# Patient Record
Sex: FEMALE | Race: WHITE | NOT HISPANIC OR LATINO | URBAN - METROPOLITAN AREA
[De-identification: names, ages, dates, MRNs, and addresses within clinical notes are randomized per-mention and may not be internally consistent; named-entity substitution may affect disease eponyms.]

---

## 2020-01-09 ENCOUNTER — APPOINTMENT (RX ONLY)
Dept: URBAN - METROPOLITAN AREA CLINIC 10 | Facility: CLINIC | Age: 40
Setting detail: DERMATOLOGY
End: 2020-01-09

## 2020-01-09 DIAGNOSIS — Z02.9 ENCOUNTER FOR ADMINISTRATIVE EXAMINATIONS, UNSPECIFIED: ICD-10-CM

## 2020-01-09 PROCEDURE — ? NO SHOW PLAN

## 2020-01-14 ENCOUNTER — APPOINTMENT (RX ONLY)
Dept: URBAN - METROPOLITAN AREA CLINIC 10 | Facility: CLINIC | Age: 40
Setting detail: DERMATOLOGY
End: 2020-01-14

## 2020-01-14 DIAGNOSIS — L29.8 OTHER PRURITUS: ICD-10-CM

## 2020-01-14 DIAGNOSIS — Z00.00 ENCOUNTER FOR GENERAL ADULT MEDICAL EXAMINATION WITHOUT ABNORMAL FINDINGS: ICD-10-CM

## 2020-01-14 DIAGNOSIS — L29.89 OTHER PRURITUS: ICD-10-CM

## 2020-01-14 DIAGNOSIS — D22 MELANOCYTIC NEVI: ICD-10-CM

## 2020-01-14 DIAGNOSIS — Z71.89 OTHER SPECIFIED COUNSELING: ICD-10-CM

## 2020-01-14 DIAGNOSIS — L90.0 LICHEN SCLEROSUS ET ATROPHICUS: ICD-10-CM

## 2020-01-14 PROBLEM — D23.9 OTHER BENIGN NEOPLASM OF SKIN, UNSPECIFIED: Status: ACTIVE | Noted: 2020-01-14

## 2020-01-14 PROBLEM — Z71.1 PERSON WITH FEARED HEALTH COMPLAINT IN WHOM NO DIAGNOSIS IS MADE: Status: ACTIVE | Noted: 2020-01-14

## 2020-01-14 PROBLEM — D22.9 MELANOCYTIC NEVI, UNSPECIFIED: Status: ACTIVE | Noted: 2020-01-14

## 2020-01-14 PROCEDURE — 99214 OFFICE O/P EST MOD 30 MIN: CPT

## 2020-01-14 PROCEDURE — ? PRESCRIPTION

## 2020-01-14 PROCEDURE — ? COUNSELING

## 2020-01-14 RX ORDER — HALOBETASOL PROPIONATE 0.5 MG/G
SMALL AMOUNT OINTMENT TOPICAL
Qty: 1 | Refills: 3 | Status: ERX | COMMUNITY
Start: 2020-01-14

## 2020-01-14 RX ORDER — FLUOCINONIDE 0.5 MG/ML
SOLUTION TOPICAL
Qty: 1 | Refills: 3 | Status: ERX | COMMUNITY
Start: 2020-01-14

## 2020-01-14 RX ORDER — TACROLIMUS 1 MG/G
SMALL AMOUNT OINTMENT TOPICAL BID
Qty: 1 | Refills: 5 | Status: ERX | COMMUNITY
Start: 2020-01-14

## 2020-01-14 RX ADMIN — FLUOCINONIDE AS DIRECTED: 0.5 SOLUTION TOPICAL at 00:00

## 2020-01-14 RX ADMIN — HALOBETASOL PROPIONATE SMALL AMOUNT: 0.5 OINTMENT TOPICAL at 00:00

## 2020-01-14 RX ADMIN — TACROLIMUS SMALL AMOUNT: 1 OINTMENT TOPICAL at 00:00

## 2020-01-14 ASSESSMENT — LOCATION ZONE DERM
LOCATION ZONE: VULVA
LOCATION ZONE: SCALP
LOCATION ZONE: FEET

## 2020-01-14 ASSESSMENT — LOCATION SIMPLE DESCRIPTION DERM
LOCATION SIMPLE: LEFT PLANTAR SURFACE
LOCATION SIMPLE: RIGHT PLANTAR SURFACE
LOCATION SIMPLE: POSTERIOR SCALP
LOCATION SIMPLE: VULVA

## 2020-01-14 ASSESSMENT — LOCATION DETAILED DESCRIPTION DERM
LOCATION DETAILED: POSTERIOR MID-PARIETAL SCALP
LOCATION DETAILED: LEFT PLANTAR FOREFOOT OVERLYING 3RD METATARSAL
LOCATION DETAILED: RIGHT PLANTAR FOREFOOT OVERLYING 2ND METATARSAL
LOCATION DETAILED: LEFT LABIA MAJORA

## 2020-01-14 NOTE — HPI: RASH
What Type Of Note Output Would You Prefer (Optional)?: Standard Output
Is The Patient Presenting As Previously Scheduled?: Yes
How Severe Is Your Rash?: moderate
Is This A New Presentation, Or A Follow-Up?: Rash
Additional History: Taking ketoconazole cream, clobetasol ointment, and betamethasone-gentamicin-miconazole cream. Pt states nothing is working. States that tea tree ointment is working the best on the scalp and gential area. Using terbinafine on feet. She sees her gynecologist for lichen sclerosus of the vulva

## 2020-09-21 ENCOUNTER — APPOINTMENT (RX ONLY)
Dept: URBAN - METROPOLITAN AREA CLINIC 10 | Facility: CLINIC | Age: 40
Setting detail: DERMATOLOGY
End: 2020-09-21

## 2020-09-21 DIAGNOSIS — L91.8 OTHER HYPERTROPHIC DISORDERS OF THE SKIN: ICD-10-CM

## 2020-09-21 DIAGNOSIS — A63.0 ANOGENITAL (VENEREAL) WARTS: ICD-10-CM

## 2020-09-21 DIAGNOSIS — L70.8 OTHER ACNE: ICD-10-CM

## 2020-09-21 PROCEDURE — ? COUNSELING

## 2020-09-21 PROCEDURE — ? LIQUID NITROGEN GENITALS

## 2020-09-21 PROCEDURE — 56501 DESTROY VULVA LESIONS SIM: CPT

## 2020-09-21 PROCEDURE — 99213 OFFICE O/P EST LOW 20 MIN: CPT | Mod: 25

## 2020-09-21 PROCEDURE — ? PRESCRIPTION

## 2020-09-21 RX ORDER — IMIQUIMOD 50 MG/G
SMALL AMOUNT CREAM TOPICAL TIW
Qty: 1 | Refills: 1 | Status: ERX | COMMUNITY
Start: 2020-09-21

## 2020-09-21 RX ADMIN — IMIQUIMOD SMALL AMOUNT: 50 CREAM TOPICAL at 00:00

## 2020-09-21 ASSESSMENT — LOCATION DETAILED DESCRIPTION DERM
LOCATION DETAILED: LEFT INGUINAL FOLD
LOCATION DETAILED: MONS PUBIS
LOCATION DETAILED: LEFT LABIA MAJORA
LOCATION DETAILED: RIGHT SUPRAPUBIC SKIN

## 2020-09-21 ASSESSMENT — LOCATION ZONE DERM
LOCATION ZONE: VULVA
LOCATION ZONE: TRUNK
LOCATION ZONE: LEG

## 2020-09-21 ASSESSMENT — LOCATION SIMPLE DESCRIPTION DERM
LOCATION SIMPLE: GROIN
LOCATION SIMPLE: VULVA
LOCATION SIMPLE: LEFT INGUINAL FOLD

## 2021-11-04 ENCOUNTER — APPOINTMENT (RX ONLY)
Dept: URBAN - METROPOLITAN AREA CLINIC 10 | Facility: CLINIC | Age: 41
Setting detail: DERMATOLOGY
End: 2021-11-04

## 2021-11-04 DIAGNOSIS — L90.0 LICHEN SCLEROSUS ET ATROPHICUS: ICD-10-CM

## 2021-11-04 DIAGNOSIS — D22 MELANOCYTIC NEVI: ICD-10-CM

## 2021-11-04 DIAGNOSIS — D485 NEOPLASM OF UNCERTAIN BEHAVIOR OF SKIN: ICD-10-CM

## 2021-11-04 DIAGNOSIS — L72.0 EPIDERMAL CYST: ICD-10-CM

## 2021-11-04 DIAGNOSIS — L81.8 OTHER SPECIFIED DISORDERS OF PIGMENTATION: ICD-10-CM

## 2021-11-04 DIAGNOSIS — Z71.89 OTHER SPECIFIED COUNSELING: ICD-10-CM

## 2021-11-04 DIAGNOSIS — L21.8 OTHER SEBORRHEIC DERMATITIS: ICD-10-CM

## 2021-11-04 PROBLEM — D22.5 MELANOCYTIC NEVI OF TRUNK: Status: ACTIVE | Noted: 2021-11-04

## 2021-11-04 PROBLEM — D23.9 OTHER BENIGN NEOPLASM OF SKIN, UNSPECIFIED: Status: ACTIVE | Noted: 2021-11-04

## 2021-11-04 PROBLEM — D48.5 NEOPLASM OF UNCERTAIN BEHAVIOR OF SKIN: Status: ACTIVE | Noted: 2021-11-04

## 2021-11-04 PROCEDURE — 11102 TANGNTL BX SKIN SINGLE LES: CPT

## 2021-11-04 PROCEDURE — 99214 OFFICE O/P EST MOD 30 MIN: CPT | Mod: 25

## 2021-11-04 PROCEDURE — ? OBSERVATION

## 2021-11-04 PROCEDURE — ? COUNSELING

## 2021-11-04 PROCEDURE — ? BIOPSY BY SHAVE METHOD

## 2021-11-04 PROCEDURE — ? PRESCRIPTION

## 2021-11-04 RX ORDER — HALOBETASOL PROPIONATE 0.5 MG/G
SMALL AMOUNT OINTMENT TOPICAL BID
Qty: 15 | Refills: 5 | Status: ERX

## 2021-11-04 RX ORDER — MUPIROCIN 20 MG/G
SMALL AMOUNT OINTMENT TOPICAL BID
Qty: 1 | Refills: 0 | Status: ERX | COMMUNITY
Start: 2021-11-04

## 2021-11-04 RX ORDER — CALCIPOTRIENE 0.05 MG/G
SMALL AMOUNT CREAM TOPICAL BID
Qty: 60 | Refills: 5 | Status: ERX | COMMUNITY
Start: 2021-11-04

## 2021-11-04 RX ADMIN — MUPIROCIN SMALL AMOUNT: 20 OINTMENT TOPICAL at 00:00

## 2021-11-04 RX ADMIN — CALCIPOTRIENE SMALL AMOUNT: 0.05 CREAM TOPICAL at 00:00

## 2021-11-04 ASSESSMENT — LOCATION DETAILED DESCRIPTION DERM
LOCATION DETAILED: RIGHT MEDIAL UPPER BACK
LOCATION DETAILED: LEFT SUPERIOR PARIETAL SCALP
LOCATION DETAILED: LEFT PROXIMAL POSTERIOR THIGH
LOCATION DETAILED: LEFT ULNAR DORSAL HAND
LOCATION DETAILED: LEFT LABIA MAJORA
LOCATION DETAILED: LEFT BUTTOCK

## 2021-11-04 ASSESSMENT — LOCATION SIMPLE DESCRIPTION DERM
LOCATION SIMPLE: VULVA
LOCATION SIMPLE: LEFT HAND
LOCATION SIMPLE: LEFT POSTERIOR THIGH
LOCATION SIMPLE: LEFT BUTTOCK
LOCATION SIMPLE: SCALP
LOCATION SIMPLE: RIGHT UPPER BACK

## 2021-11-04 ASSESSMENT — LOCATION ZONE DERM
LOCATION ZONE: TRUNK
LOCATION ZONE: HAND
LOCATION ZONE: SCALP
LOCATION ZONE: LEG
LOCATION ZONE: VULVA

## 2021-11-04 NOTE — PROCEDURE: BIOPSY BY SHAVE METHOD
Detail Level: Detailed
Depth Of Biopsy: dermis
Was A Bandage Applied: Yes
Size Of Lesion In Cm: 0
Biopsy Type: H and E
Biopsy Method: Dermablade
Anesthesia Type: 1% lidocaine with epinephrine
Anesthesia Volume In Cc: 0.5
Hemostasis: Drysol
Wound Care: Petrolatum
Dressing: bandage
Destruction After The Procedure: No
Type Of Destruction Used: Curettage
Curettage Text: The wound bed was treated with curettage after the biopsy was performed.
Cryotherapy Text: The wound bed was treated with cryotherapy after the biopsy was performed.
Electrodesiccation Text: The wound bed was treated with electrodesiccation after the biopsy was performed.
Electrodesiccation And Curettage Text: The wound bed was treated with electrodesiccation and curettage after the biopsy was performed.
Silver Nitrate Text: The wound bed was treated with silver nitrate after the biopsy was performed.
Lab: [id number]
Lab Facility: [id number]
Consent: Written consent was obtained and risks were reviewed including but not limited to scarring, infection, bleeding, scabbing, incomplete removal, nerve damage and allergy to anesthesia.
Post-Care Instructions: I reviewed with the patient in detail post-care instructions. Patient is to keep the biopsy site dry overnight, and then apply bacitracin twice daily until healed. Patient may apply hydrogen peroxide soaks to remove any crusting.
Notification Instructions: Patient will be notified of biopsy results. However, patient instructed to call the office if not contacted within 2 weeks.
Billing Type: Third-Party Bill
Information: Selecting Yes will display possible errors in your note based on the variables you have selected. This validation is only offered as a suggestion for you. PLEASE NOTE THAT THE VALIDATION TEXT WILL BE REMOVED WHEN YOU FINALIZE YOUR NOTE. IF YOU WANT TO FAX A PRELIMINARY NOTE YOU WILL NEED TO TOGGLE THIS TO 'NO' IF YOU DO NOT WANT IT IN YOUR FAXED NOTE.

## 2022-01-03 ENCOUNTER — RX ONLY (OUTPATIENT)
Age: 42
Setting detail: RX ONLY
End: 2022-01-03

## 2022-01-03 RX ORDER — HALOBETASOL PROPIONATE 0.5 MG/G
LIGHT FILM OINTMENT TOPICAL BID
Qty: 50 | Refills: 5 | Status: ERX